# Patient Record
Sex: FEMALE | URBAN - NONMETROPOLITAN AREA
[De-identification: names, ages, dates, MRNs, and addresses within clinical notes are randomized per-mention and may not be internally consistent; named-entity substitution may affect disease eponyms.]

---
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
## 2024-06-25 ENCOUNTER — EMPLOYEE HEALTH (OUTPATIENT)
Dept: OCCUPATIONAL MEDICINE | Facility: CLINIC | Age: 19
End: 2024-06-25

## 2024-06-25 ENCOUNTER — EH NON-PROVIDER (OUTPATIENT)
Dept: OCCUPATIONAL MEDICINE | Facility: CLINIC | Age: 19
End: 2024-06-25

## 2024-06-25 DIAGNOSIS — Z02.89 VISIT FOR OCCUPATIONAL HEALTH EXAMINATION: Primary | ICD-10-CM

## 2024-06-25 DIAGNOSIS — Z02.1 PRE-EMPLOYMENT HEALTH SCREENING EXAMINATION: ICD-10-CM

## 2024-06-25 LAB
AMP AMPHETAMINE: NORMAL
BAR BARBITURATES: NORMAL
BZO BENZODIAZEPINES: NORMAL
COC COCAINE: NORMAL
INT CON NEG: NORMAL
INT CON POS: NORMAL
MDMA ECSTASY: NORMAL
MET METHAMPHETAMINES: NORMAL
MTD METHADONE: NORMAL
OPI OPIATES: NORMAL
OXY OXYCODONE: NORMAL
PCP PHENCYCLIDINE: NORMAL
POC URINE DRUG SCREEN OCDRS: NORMAL
THC: NORMAL

## 2024-06-25 NOTE — PROGRESS NOTES
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

## 2025-03-11 ENCOUNTER — EH NON-PROVIDER (OUTPATIENT)
Dept: OCCUPATIONAL MEDICINE | Facility: CLINIC | Age: 20
End: 2025-03-11

## 2025-03-11 DIAGNOSIS — Z02.89 ENCOUNTER FOR OCCUPATIONAL HEALTH EXAMINATION INVOLVING RESPIRATOR: ICD-10-CM

## 2025-03-11 PROCEDURE — 94375 RESPIRATORY FLOW VOLUME LOOP: CPT | Performed by: PREVENTIVE MEDICINE
